# Patient Record
Sex: MALE | Race: WHITE | Employment: OTHER | URBAN - METROPOLITAN AREA
[De-identification: names, ages, dates, MRNs, and addresses within clinical notes are randomized per-mention and may not be internally consistent; named-entity substitution may affect disease eponyms.]

---

## 2024-07-17 ENCOUNTER — OFFICE VISIT (OUTPATIENT)
Dept: UROLOGY | Facility: HOSPITAL | Age: 69
End: 2024-07-17
Payer: MEDICARE

## 2024-07-17 DIAGNOSIS — N52.9 ERECTILE DISORDER: Primary | ICD-10-CM

## 2024-07-17 PROCEDURE — 99214 OFFICE O/P EST MOD 30 MIN: CPT | Performed by: STUDENT IN AN ORGANIZED HEALTH CARE EDUCATION/TRAINING PROGRAM

## 2024-07-17 PROCEDURE — 1159F MED LIST DOCD IN RCRD: CPT | Performed by: STUDENT IN AN ORGANIZED HEALTH CARE EDUCATION/TRAINING PROGRAM

## 2024-07-17 PROCEDURE — 99204 OFFICE O/P NEW MOD 45 MIN: CPT | Performed by: STUDENT IN AN ORGANIZED HEALTH CARE EDUCATION/TRAINING PROGRAM

## 2024-07-17 RX ORDER — TADALAFIL 5 MG/1
5 TABLET ORAL DAILY
Qty: 30 TABLET | Refills: 3 | Status: SHIPPED | OUTPATIENT
Start: 2024-07-17 | End: 2024-11-14

## 2024-07-17 NOTE — PROGRESS NOTES
Subjective   Patient ID: Amando Hart is a 68 y.o. male    HPI  68 y.o. male who  presenting for the first time to discuss concerns regarding his testosterone levels and erectile dysfunction. He reports that his testosterone levels were found to be on the low side, but within the normal reference range (almost 300, with the reference range starting at 193). He is seeking a second opinion regarding the need for testosterone replacement therapy. The patient is concerned about the risks associated with testosterone therapy, including clots, heart attack, heart failure, stroke, and potential prostate cancer flare-ups.    Mr. Sippy also reports a decline in his sexual drive and quality of erections, which he attributes to aging. He mentions that his erections are not as good as they used to be, and he is interested in improving the quality of his erections. He has a history of taking Viagra occasionally, which has been inconsistently effective.    The patient also mentions recent findings of low blood pressure and slightly lower blood sugar levels. He is scheduled to see an endocrinologist next month for further evaluation. He has a history of high blood pressure and cholesterol, for which he takes medication.    The most recent total testosterone, conducted on 6/4/2024, revealed:  289 ng/dl       Review of Systems    All systems were reviewed. Anything negative was noted in the HPI.    Objective   Physical Exam    General: Well developed, well nourished, alert and cooperative, appears in no acute distress   Eyes: Non-injected conjunctiva, sclera clear, no proptosis   Cardiac: Extremities are warm and well perfused. No edema, cyanosis or pallor   Lungs: Breathing is easy, non-labored. Speaking in clear and complete sentences. Normal diaphragmatic movement   MSK: Ambulatory with steady gait, unassisted   Neuro: Alert and oriented to person, place, and time   Psych: Demonstrates good judgment and reason, without  hallucinations, abnormal affect or abnormal behaviors   Skin: No obvious lesions, no rashes       No CVA tenderness bilaterally   No suprapubic pain or discomfort       No past medical history on file.      No past surgical history on file.        Assessment/Plan   Erectile Dysfunction (ED)    68 y.o. male who presents for the above condition, We had a very long and extensive discussion with the patient regarding the pathophysiology, differential diagnosis, risk factor, and management of ED. We discussed at length mechanism of action, risk, benefit, potential complication, adverse events of PDE 5 inhibitors in the form of Tadalafil 5 mg daily. Instructed the patient to stop the medication in case of any side effects.      Plan:  - Tadalafil 5 mg/day        7/17/2024    Scribe Attestation  By signing my name below, I, Maxim Becerra, Scrdavid   attest that this documentation has been prepared under the direction and in the presence of Dr. Amadou Melvin

## 2024-12-18 NOTE — PROGRESS NOTES
Subjective   Patient ID: Amando Hart is a 69 y.o. male    HPI  69 y.o. male who presents for 6 month follow-up visit regarding his testosterone levels and erectile dysfunction. He reports that his testosterone levels were found to be on the low side, but within the normal reference range (almost 300, with the reference range starting at 193). He is seeking a second opinion regarding the need for testosterone replacement therapy. The patient is concerned about the risks associated with testosterone therapy, including clots, heart attack, heart failure, stroke, and potential prostate cancer flare-ups.    Mr. Sippy also reports a decline in his sexual drive and quality of erections, which he attributes to aging. He mentions that his erections are not as good as they used to be, and he is interested in improving the quality of his erections. He has a history of taking Viagra occasionally, which has been inconsistently effective.    The patient also mentions recent findings of low blood pressure and slightly lower blood sugar levels. He is scheduled to see an endocrinologist next month for further evaluation. He has a history of high blood pressure and cholesterol, for which he takes medication.    Today he denies any side effects after starting Cialis 5 mg.       Review of Systems    All systems were reviewed. Anything negative was noted in the HPI.    Objective   Physical Exam    General: Well developed, well nourished, alert and cooperative, appears in no acute distress   Eyes: Non-injected conjunctiva, sclera clear, no proptosis   Cardiac: Extremities are warm and well perfused. No edema, cyanosis or pallor   Lungs: Breathing is easy, non-labored. Speaking in clear and complete sentences. Normal diaphragmatic movement   MSK: Ambulatory with steady gait, unassisted   Neuro: Alert and oriented to person, place, and time   Psych: Demonstrates good judgment and reason, without hallucinations, abnormal affect or  abnormal behaviors   Skin: No obvious lesions, no rashes       No CVA tenderness bilaterally   No suprapubic pain or discomfort       No past medical history on file.      No past surgical history on file.        Assessment/Plan   Erectile Dysfunction (ED) responding well to 5mg Tadalafil     69 y.o. male who presents for the above condition, We had a very long and extensive discussion with the patient regarding the pathophysiology, differential diagnosis, risk factor, and management of ED. We discussed at length mechanism of action, risk, benefit, potential complication, adverse events of PDE 5 inhibitors in the form of Tadalafil 5 mg daily. Instructed the patient to stop the medication in case of any side effects.      Plan:  - Refill Cialis 5 mg tablet once daily  - Follow up in 1 year with NP Jessie Pastor      E&M visit today is associated with current or anticipated ongoing medical care services related to a patient's single, serious condition or a complex condition.     12/19/2024    Scribe Attestation  By signing my name below, IPascual Scribe attest that this documentation has been prepared under the direction and in the presence of Dr. Amadou Melvin.

## 2024-12-19 ENCOUNTER — OFFICE VISIT (OUTPATIENT)
Dept: UROLOGY | Facility: HOSPITAL | Age: 69
End: 2024-12-19
Payer: MEDICARE

## 2024-12-19 DIAGNOSIS — R39.12 POOR URINARY STREAM: ICD-10-CM

## 2024-12-19 DIAGNOSIS — N52.9 ERECTILE DISORDER: Primary | ICD-10-CM

## 2024-12-19 PROCEDURE — 99214 OFFICE O/P EST MOD 30 MIN: CPT | Performed by: STUDENT IN AN ORGANIZED HEALTH CARE EDUCATION/TRAINING PROGRAM

## 2024-12-19 PROCEDURE — 1159F MED LIST DOCD IN RCRD: CPT | Performed by: STUDENT IN AN ORGANIZED HEALTH CARE EDUCATION/TRAINING PROGRAM

## 2024-12-19 PROCEDURE — G2211 COMPLEX E/M VISIT ADD ON: HCPCS | Performed by: STUDENT IN AN ORGANIZED HEALTH CARE EDUCATION/TRAINING PROGRAM

## 2024-12-19 RX ORDER — TADALAFIL 5 MG/1
5 TABLET ORAL DAILY
Qty: 90 TABLET | Refills: 3 | Status: SHIPPED | OUTPATIENT
Start: 2024-12-19 | End: 2025-12-19